# Patient Record
Sex: FEMALE | Race: WHITE | Employment: FULL TIME | URBAN - METROPOLITAN AREA
[De-identification: names, ages, dates, MRNs, and addresses within clinical notes are randomized per-mention and may not be internally consistent; named-entity substitution may affect disease eponyms.]

---

## 2019-03-06 ENCOUNTER — HOSPITAL ENCOUNTER (OUTPATIENT)
Dept: PHYSICAL THERAPY | Age: 54
Discharge: HOME | End: 2019-03-06
Payer: COMMERCIAL

## 2019-03-06 PROCEDURE — 97161 PT EVAL LOW COMPLEX 20 MIN: CPT

## 2019-03-06 PROCEDURE — 97530 THERAPEUTIC ACTIVITIES: CPT

## 2019-03-06 PROCEDURE — 97110 THERAPEUTIC EXERCISES: CPT

## 2019-03-06 NOTE — PROGRESS NOTES
[facility] [address]              [address] [facility]        [place], [address] [place]   [phone] fax    Plan of Care/ Statement of Necessity for Physical Therapy Services  Patient name: Cami Ann Start of Care: 3/6/2019   Referral source: Trista Ragsdale MD : 1965    Medical Diagnosis: TMJ (temporomandibular joint disorder) [M26.609]  Payor: OPTIMA / Plan: [place] [employer]  CAPITATED PT / Product Type: Commerical /  Onset Date:1-2 months prior to IE    Treatment Diagnosis: Left Craniofacial Pain   Prior Hospitalization: see medical history Provider#: [id number]   Medications: Verified on Patient summary List    Comorbidities: Diabetes, HTN, Depression, Chronic Recurrent Ear Infections, Anxiety, BMI>30, GI disease   Prior Level of Function:  (I) Functional ADLs, (I) Self-Care ADLs, Work [employer] of Care and following information is based on the information from the initial evaluation. Assessment:    Patient presents with chronic recurrent bilateral ear infections without known issue with most recent ear infection x1-2 months without positive benefit with medicinal prescription. At time of evaluation patient with normal cranial nerve screen with normal screen of upper cervical spine and temporomandibular joint therefore this therapist with the belief that patient is not experiencing symptoms secondary to TMJ nor upper cervical dysfunction. Question patient benefit with provision of OTC dental  secondary to [address] consistent with jaw clenching with potential nocturnal bruxism. Patient provided with exercises to improve secondary muscular hypertonicity due to chronic ear infection. Secondary to lack of positive objective findings patient to be placed on 30 day hold to allow for completion of further diagnostic testing. Patient educated to contact clinic if further physical therapy services deemed appropriate.     Key Information:    Cervical Retraction: [x] WNL    [] Abnormal:              - Left retro-aural tightness with suboccipital tightness     CN Screen: Diminished hearing on left, All other screening WNL     TMJ Screen: All AROM WNL without symptom provocation, No pain with depression with OP              - Palpation: Symmetrical TMJ movement bilaterally              - Extra-Oral Palpation: symmetrical TTP to bilateral masseters    Cervical Special Tests:       Alar Ligament: Normal end-feel left/right       Transverse Ligament: Normal              Cervical Flexion Rotation Test: (-) Bilaterally    Evaluation Complexity History MEDIUM  Complexity : 1-2 comorbidities / personal factors will impact the outcome/ POC ; Examination LOW Complexity : 1-2 Standardized tests and measures addressing body structure, function, activity limitation and / or participation in recreation  ;Presentation LOW Complexity : Stable, uncomplicated  ;Clinical Decision Making MEDIUM Complexity : FOTO score of 26-74  Overall Complexity Rating: LOW   Problem List: pain affecting function, decrease ROM and decrease strength   Treatment Plan may include any combination of the following: Therapeutic exercise, Therapeutic activities, Neuromuscular re-education, Physical agent/modality, Manual therapy, Patient education, Self Care training and Functional mobility training  Patient / Family readiness to learn indicated by: asking questions, trying to perform skills and interest  Persons(s) to be included in education: patient (P)  Barriers to Learning/Limitations: None  Patient Goal (s): I just want my ears to stop hurting  Patient Self Reported Health Status: poor  Rehabilitation Potential: fair    Short Term Goals: To be accomplished in 2 treatments:  1. Patient will subjectively report full compliance with prescribed HEP. Eval: HEP provided  2. Patient will demonstrate cervical extension and left rotation AROM WNL without pain to improve ease with overhead ADLs.   Eval: Cervical Extension AROM = WNL (right retro-aural fullness), Cervical Left Rotation AROM (right retro-aural tightness)  3. Patient will demonstrate a significant functional improvement as demonstrated by a score of >/= 75 on FOTO. Eval: FOTO = 71     **Further physical therapy goals to be created if further physical therapy services deemed appropriate. Frequency / Duration: Patient to be seen 1 times per week for 2 weeks. Patient/ Caregiver education and instruction: Diagnosis, prognosis, self care, activity modification, brace/ splint application and exercises   [x]  Plan of care has been reviewed with JIMMIE Wolf, PT 3/6/2019 12:21 PM  ________________________________________________________________________    I certify that the above Therapy Services are being furnished while the patient is under my care. I agree with the treatment plan and certify that this therapy is necessary.     Physician's Signature:____________Date:_________TIME:________    ** Signature, Date and Time must be completed for valid certification **  Please sign and return to [facility]/ Rashad Dowell [address]              [address] [facility], [address] [place]   [phone] fax

## 2019-04-09 NOTE — PROGRESS NOTES
[facility] [address] [facility] [place], [address] [place]  
[phone] fax Discharge Summary Patient name: Tre Cloud Start of Care: 3/6/2019 Referral source: Jah Chris MD : 1965 Medical/Treatment Diagnosis: TMJ (temporomandibular joint disorder) [M26.609] Payor: Jony Gomes / Plan: [facility] [email or web address]  CAPITATED PT / Product Type: Commerical /  Onset Date:1-2 months prior to IE 
  
Prior Hospitalization: see medical history Provider#: [id number] Medications: Verified on Patient Summary List   
Comorbidities: Diabetes, HTN, Depression, Chronic Recurrent Ear Infections, Anxiety, BMI>30, GI disease Prior Level of Function:  (I) Functional ADLs, (I) Self-Care ADLs, Work [employer] Visits from Start of Care: 1    Missed Visits: 0 Reporting Period : 3/6/2019 to 3/6/2019 Summary of Care: 
 
Short Term Goals: To be accomplished in 2 treatments: 1. Patient will subjectively report full compliance with prescribed HEP. Eval: HEP provided At DC: Inability to assess secondary to patient non-attendance 2. Patient will demonstrate cervical extension and left rotation AROM WNL without pain to improve ease with overhead ADLs. Eval: Cervical Extension AROM = WNL (right retro-aural fullness), Cervical Left Rotation AROM (right retro-aural tightness) At DC: Inability to assess secondary to patient non-attendance 3. Patient will demonstrate a significant functional improvement as demonstrated by a score of >/= 75 on FOTO. Eval: FOTO = 71 At DC: Inability to assess secondary to patient non-attendance ASSESSMENT/RECOMMENDATIONS: 
 
At this time patient to be discharged in accordance with clinic 30 day policy with patient without scheduled follow up appointments post-evaluation 3/6/2019.  At time of evaluation patient without positive objective findings with patient presenting with normal objective examination. Patient without contact with clinic within 30 day period. [x]Discontinue therapy: []Patient has reached or is progressing toward set goals [x]Patient is non-compliant or has abdicated 
    []Due to lack of appreciable progress towards set goals Shavon Yo, PT 4/9/2019 4:33 PM

## 2022-09-04 NOTE — PROGRESS NOTES
PT DAILY TREATMENT NOTE 10-18    Patient Name: Larry   Date:3/6/2019  : 1965  [x]  Patient  Verified  Payor: Imer Georges / Plan: [place] OPTIMA  [employer] PT / Product Type: Commerical /    In time:200  Out time:248  Total Treatment Time (min): 48  Visit #: 1 of 2    Treatment Area: TMJ (temporomandibular joint disorder) [M26.609]    Physical Therapy Evaluation Cervical Spine    SUBJECTIVE    Any medication changes, allergies to medications, adverse drug reactions, diagnosis change, or new procedure performed?: [x] No    [] Yes (see summary sheet for update)    Subjective functional status/changes:     PLOF: (I) Functional ADLs, (I) Self-Care ADLs, Work [employer]  Current Functional Status: No sleep disturbances, No functional activity limitations  Work Hx: [employer] Occupation  Comorbidities: Diabetes, HTN, Depression, Chronic Recurrent Ear Infections, Anxiety, BMI>30, GI isease  Medications: Advil (PRN)    Pain Intensity (0-10, VAS): Current 2-3, Worst 6, Best 2-3    Objective: Patient with a chronic 2-3 year history of recurrent bilateral ear infections with patient reporting temporary relief with provision of antibiotics. Patient reports most recent ear infection x1-2 months without improvement with prescription of medication with patient reporting symptoms localized to left ear with sensation of left ear fullness, diminished left hearing and occasional tinnitus. Patient denies pain localized to the TMJ bilaterally with patient denying the following: change in symptoms with mastication, pain with talking and pain with yawning. Patient does report occasional jaw clenching, associated with stress, with patient unsure regarding nocturnal grinding. Patient denies the following red flag s/s: HA/migraine, dizziness, head trauma, ear surgery, diplopia. Patient Goals: \"I just want my ears to stop hurting\"     OBJECTIVE    BP: 134/88 mmHg  Posture:  Increased forward head posture    Cervical Retraction: [x] WNL    [] Abnormal:   - Left retro-aural tightness with suboccipital tightness    CN Screen: Diminished hearing on left, All other screening WNL    TMJ Screen: All AROM WNL without symptom provocation, No pain with depression with OP   - Palpation: Symmetrical TMJ movement bilaterally   - Extra-Oral Palpation: symmetrical TTP to bilateral masseters    Active Movements: [] N/A   [] Too acute   [] Other:  ROM % AROM Comments   Forward flexion 0% limited    Extension 0% limited Left retro-aural fullness   SB right 0% limited    SB left  0% limited    Rotation right 0% limited    Rotation left 0% limited Left retro-aural tightness     Cervical Special Tests:       Alar Ligament: Normal end-feel left/right       Transverse Ligament: Normal        Cervical Flexion Rotation Test: (-) Bilaterally    Muscle Flexibility: [] N/A   Pect.  Minor: [] WNL    [x] Tight    [x] R    [x] L    Joint Mobility Assessment   Cervical:     Cervical Side Glide: Painful left lateral glide C2/C3    Prone UPA: No symptom reproduction with completion bilaterally C1-C3    OBJECTIVE    32 min [x]Eval                  []Re-Eval     8 min Therapeutic Exercise:  [x] See flow sheet : Patient educated in completion of prescribed HEP and provided with written HEP instructions   Rationale: increase ROM and increase strength to improve the patients ability to improve ease with self-care ADLs    8 min Therapeutic Activity:  [x]  See flow sheet : Discussion with patient regarding potential benefit of prescription of , Discussion regarding importance of following up with ENT secondary to recurrent ear infections   Rationale: increase ROM and increase strength  to improve the patients ability to improve overall health           With   [] TE   [] TA   [] neuro   [] other: Patient Education: [x] Review HEP    [] Progressed/Changed HEP based on:   [] positioning   [] body mechanics   [] transfers   [] heat/ice application    [] other:      Other Objective/Functional Measures: See objective information above. Pain Level (0-10 scale) post treatment: 2-3    ASSESSMENT/Changes in Function: Patient presents with chronic recurrent bilateral ear infections without known issue with most recent ear infection x1-2 months without positive benefit with medicinal prescription. At time of evaluation patient with normal cranial nerve screen with normal screen of upper cervical spine and temporomandibular joint therefore this therapist with the belief that patient is not experiencing symptoms secondary to TMJ nor upper cervical dysfunction. Question patient benefit with provision of OTC dental  secondary to [address] consistent with jaw clenching with potential nocturnal bruxism. Patient provided with exercises to improve secondary muscular hypertonicity due to chronic ear infection. Secondary to lack of positive objective findings patient to be placed on 30 day hold to allow for completion of further diagnostic testing. Patient educated to contact clinic if further physical therapy services deemed appropriate. [x]  See Plan of Care  []  See progress note/recertification  []  See Discharge Summary         Progress towards goals / Updated goals:    Short Term Goals: To be accomplished in 2 treatments:  1. Patient will subjectively report full compliance with prescribed HEP. Eval: HEP provided  2. Patient will demonstrate cervical extension and left rotation AROM WNL without pain to improve ease with overhead ADLs. Eval: Cervical Extension AROM = WNL (right retro-aural fullness), Cervical Left Rotation AROM (right retro-aural tightness)  3. Patient will demonstrate a significant functional improvement as demonstrated by a score of >/= 75 on FOTO. Eval: FOTO = 71    **Further physical therapy goals to be created if further physical therapy services deemed appropriate.     PLAN  []  Upgrade activities as tolerated     [] Continue plan of care  []  Update interventions per flow sheet       []  Discharge due to:_  [x]  Other:_  Hold x30 days    Sharif Caballero, PT 3/6/2019  12:20 PM    Future Appointments   Date Time Provider Ulises Torres   3/6/2019  2:00 PM Fernandez Duncan, PT [facility] PRINCIPAL DISCHARGE DIAGNOSIS  Diagnosis: GI bleed  Assessment and Plan of Treatment: From ulcer in the GI tract. Continue acid reducing medication (protonix/Pantoprazole) and follow up outpatient with the GI doctor and your primary medical doctor.       PRINCIPAL DISCHARGE DIAGNOSIS  Diagnosis: GI bleed  Assessment and Plan of Treatment: From ulcer in the GI tract. Continue acid reducing medication (protonix/Pantoprazole) and follow up outpatient with the GI doctor and your primary medical doctor.  Avoid any and all NSAIDs (examples: Ibuprofen, Aleve, motrin, naproxen, etc)

## 2023-09-25 PROBLEM — R63.1 POLYDIPSIA: Status: ACTIVE | Noted: 2021-08-10

## 2023-09-25 PROBLEM — G47.00 INSOMNIA: Status: ACTIVE | Noted: 2018-11-21

## 2023-09-25 PROBLEM — E11.65 POORLY CONTROLLED DIABETES MELLITUS (HCC): Status: ACTIVE | Noted: 2021-08-10

## 2023-09-25 PROBLEM — K76.0 HEPATIC STEATOSIS: Status: ACTIVE | Noted: 2018-11-21

## 2023-09-25 PROBLEM — E11.00 HYPEROSMOLAR HYPERGLYCEMIC STATE (HHS) (HCC): Status: ACTIVE | Noted: 2021-08-10

## 2023-09-25 PROBLEM — K57.32 DIVERTICULITIS OF COLON: Status: ACTIVE | Noted: 2018-11-21

## 2023-09-25 PROBLEM — A04.72 C. DIFFICILE DIARRHEA: Status: ACTIVE | Noted: 2018-12-13

## 2023-09-25 PROBLEM — J45.909 ASTHMA: Status: ACTIVE | Noted: 2019-05-14

## 2023-09-25 PROBLEM — E66.9 OBESITY (BMI 30.0-34.9): Status: ACTIVE | Noted: 2021-08-10

## 2023-09-25 PROBLEM — F32.0 CURRENT MILD EPISODE OF MAJOR DEPRESSIVE DISORDER (HCC): Status: ACTIVE | Noted: 2022-09-29

## 2023-09-25 PROBLEM — E11.8 TYPE 2 DIABETES MELLITUS WITH COMPLICATIONS (HCC): Status: ACTIVE | Noted: 2018-11-21

## 2023-09-25 PROBLEM — F32.A ACUTE DEPRESSION: Status: ACTIVE | Noted: 2019-05-14

## 2023-09-25 PROBLEM — R35.89 POLYURIA: Status: ACTIVE | Noted: 2021-08-10

## 2023-09-25 PROBLEM — R60.1 GENERALIZED EDEMA: Status: ACTIVE | Noted: 2019-07-19

## 2023-09-25 PROBLEM — E78.5 HLD (HYPERLIPIDEMIA): Status: ACTIVE | Noted: 2021-08-10

## 2023-09-25 PROBLEM — E87.1 HYPEROSMOLAR HYPONATREMIA: Status: ACTIVE | Noted: 2021-08-10

## 2023-09-25 PROBLEM — K21.9 GASTROESOPHAGEAL REFLUX DISEASE: Status: ACTIVE | Noted: 2019-05-14

## 2023-09-25 PROBLEM — Z91.148 NONCOMPLIANCE WITH MEDICATIONS: Status: ACTIVE | Noted: 2021-08-10

## 2023-09-25 PROBLEM — E11.9 DIABETES MELLITUS (HCC): Status: ACTIVE | Noted: 2019-05-14

## 2023-09-25 PROBLEM — A04.72 CLOSTRIDIUM DIFFICILE COLITIS: Status: ACTIVE | Noted: 2019-04-03

## 2023-09-25 PROBLEM — K21.9 GERD WITHOUT ESOPHAGITIS: Status: ACTIVE | Noted: 2018-11-21

## 2023-09-25 PROBLEM — F41.8 MIXED ANXIETY AND DEPRESSIVE DISORDER: Status: ACTIVE | Noted: 2019-05-14

## 2023-09-25 PROBLEM — E66.811 OBESITY (BMI 30.0-34.9): Status: ACTIVE | Noted: 2021-08-10

## 2023-09-25 PROBLEM — E87.0 HYPEROSMOLAR HYPONATREMIA: Status: ACTIVE | Noted: 2021-08-10

## 2023-10-27 ENCOUNTER — ANESTHESIA (OUTPATIENT)
Facility: HOSPITAL | Age: 58
End: 2023-10-27
Payer: COMMERCIAL

## 2023-10-27 ENCOUNTER — ANESTHESIA EVENT (OUTPATIENT)
Facility: HOSPITAL | Age: 58
End: 2023-10-27
Payer: COMMERCIAL

## 2023-10-27 ENCOUNTER — HOSPITAL ENCOUNTER (OUTPATIENT)
Facility: HOSPITAL | Age: 58
Setting detail: OUTPATIENT SURGERY
Discharge: HOME | End: 2023-10-27
Attending: INTERNAL MEDICINE | Admitting: INTERNAL MEDICINE
Payer: COMMERCIAL

## 2023-10-27 VITALS
HEIGHT: 66 IN | OXYGEN SATURATION: 100 % | BODY MASS INDEX: 24.98 KG/M2 | WEIGHT: 155.4 LBS | TEMPERATURE: 97.5 F | SYSTOLIC BLOOD PRESSURE: 149 MMHG | HEART RATE: 90 BPM | RESPIRATION RATE: 13 BRPM | DIASTOLIC BLOOD PRESSURE: 76 MMHG

## 2023-10-27 LAB
GLUCOSE BLD STRIP.AUTO-MCNC: 239 MG/DL (ref 70–110)
GLUCOSE BLD STRIP.AUTO-MCNC: 375 MG/DL (ref 70–110)

## 2023-10-27 PROCEDURE — 3600007502: Performed by: INTERNAL MEDICINE

## 2023-10-27 PROCEDURE — 3700000000 HC ANESTHESIA ATTENDED CARE: Performed by: INTERNAL MEDICINE

## 2023-10-27 PROCEDURE — 88305 TISSUE EXAM BY PATHOLOGIST: CPT

## 2023-10-27 PROCEDURE — 7100000010 HC PHASE II RECOVERY - FIRST 15 MIN: Performed by: INTERNAL MEDICINE

## 2023-10-27 PROCEDURE — 82962 GLUCOSE BLOOD TEST: CPT

## 2023-10-27 PROCEDURE — 3600007512: Performed by: INTERNAL MEDICINE

## 2023-10-27 PROCEDURE — 3700000001 HC ADD 15 MINUTES (ANESTHESIA): Performed by: INTERNAL MEDICINE

## 2023-10-27 PROCEDURE — 6370000000 HC RX 637 (ALT 250 FOR IP): Performed by: STUDENT IN AN ORGANIZED HEALTH CARE EDUCATION/TRAINING PROGRAM

## 2023-10-27 PROCEDURE — 2709999900 HC NON-CHARGEABLE SUPPLY: Performed by: INTERNAL MEDICINE

## 2023-10-27 PROCEDURE — 7100000000 HC PACU RECOVERY - FIRST 15 MIN: Performed by: INTERNAL MEDICINE

## 2023-10-27 PROCEDURE — 2580000003 HC RX 258: Performed by: STUDENT IN AN ORGANIZED HEALTH CARE EDUCATION/TRAINING PROGRAM

## 2023-10-27 RX ORDER — DEXTROSE MONOHYDRATE 100 MG/ML
INJECTION, SOLUTION INTRAVENOUS CONTINUOUS PRN
Status: DISCONTINUED | OUTPATIENT
Start: 2023-10-27 | End: 2023-10-27 | Stop reason: HOSPADM

## 2023-10-27 RX ORDER — SODIUM CHLORIDE, SODIUM LACTATE, POTASSIUM CHLORIDE, CALCIUM CHLORIDE 600; 310; 30; 20 MG/100ML; MG/100ML; MG/100ML; MG/100ML
INJECTION, SOLUTION INTRAVENOUS CONTINUOUS
Status: DISCONTINUED | OUTPATIENT
Start: 2023-10-27 | End: 2023-10-27 | Stop reason: HOSPADM

## 2023-10-27 RX ORDER — INSULIN LISPRO 100 [IU]/ML
0-15 INJECTION, SOLUTION INTRAVENOUS; SUBCUTANEOUS ONCE
Status: COMPLETED | OUTPATIENT
Start: 2023-10-27 | End: 2023-10-27

## 2023-10-27 RX ORDER — PROPOFOL 10 MG/ML
INJECTION, EMULSION INTRAVENOUS PRN
Status: DISCONTINUED | OUTPATIENT
Start: 2023-10-27 | End: 2023-10-27 | Stop reason: SDUPTHER

## 2023-10-27 RX ADMIN — PROPOFOL 130 MG: 10 INJECTION, EMULSION INTRAVENOUS at 11:39

## 2023-10-27 RX ADMIN — PROPOFOL 20 MG: 10 INJECTION, EMULSION INTRAVENOUS at 11:53

## 2023-10-27 RX ADMIN — PROPOFOL 30 MG: 10 INJECTION, EMULSION INTRAVENOUS at 11:48

## 2023-10-27 RX ADMIN — Medication 60 MG: at 11:39

## 2023-10-27 RX ADMIN — PROPOFOL 30 MG: 10 INJECTION, EMULSION INTRAVENOUS at 11:51

## 2023-10-27 RX ADMIN — INSULIN LISPRO 15 UNITS: 100 INJECTION, SOLUTION INTRAVENOUS; SUBCUTANEOUS at 10:43

## 2023-10-27 RX ADMIN — PROPOFOL 30 MG: 10 INJECTION, EMULSION INTRAVENOUS at 11:42

## 2023-10-27 RX ADMIN — SODIUM CHLORIDE, POTASSIUM CHLORIDE, SODIUM LACTATE AND CALCIUM CHLORIDE: 600; 310; 30; 20 INJECTION, SOLUTION INTRAVENOUS at 10:42

## 2023-10-27 RX ADMIN — PROPOFOL 30 MG: 10 INJECTION, EMULSION INTRAVENOUS at 11:44

## 2023-10-27 ASSESSMENT — ENCOUNTER SYMPTOMS: SHORTNESS OF BREATH: 1

## 2023-10-27 ASSESSMENT — PAIN SCALES - GENERAL: PAINLEVEL_OUTOF10: 0

## 2023-10-27 NOTE — ANESTHESIA POSTPROCEDURE EVALUATION
Department of Anesthesiology  Postprocedure Note    Patient: Mey Quiroga  MRN: [id number]  YOB: 1965  Date of evaluation: 10/27/2023      Procedure Summary     Date: 10/27/23 Room / Location: [facility] ENDO 03 / [facility] ENDOSCOPY    Anesthesia Start: 1132 Anesthesia Stop: 1203    Procedures:       COLONOSCOPY with random colon biopsies      EGD ESOPHAGOGASTRODUODENOSCOPY with biopsies (Upper GI Region) Diagnosis:       Functional diarrhea      Change in bowel habits      Non-alcoholic fatty liver disease      (Functional diarrhea [K59.1])      (Change in bowel habits [R19.4])      (Non-alcoholic fatty liver disease [K76.0])    Surgeons: Heather Huerta MD Responsible Provider: Rosie Hamilton MD    Anesthesia Type: MAC ASA Status: 3          Anesthesia Type: MAC    Ava Phase I: Ava Score: 10    Ava Phase II: Ava Score: 10      Anesthesia Post Evaluation    Patient location during evaluation: PACU  Patient participation: complete - patient participated  Level of consciousness: sleepy but conscious  Pain score: 0  Airway patency: patent  Nausea & Vomiting: no nausea and no vomiting  Complications: no  Cardiovascular status: blood pressure returned to baseline  Respiratory status: acceptable  Hydration status: euvolemic  Pain management: adequate

## 2023-10-27 NOTE — H&P
tablet by mouth twice a day  pantoprazole 40 mg  Pristiq 100 mg Take 1 tablet by mouth once a day  Recombivax HB (PF) 10 mcg/mL One ml injection in deltoid 0 30 and 180 days  Spironolactone 50 mg Take 1 tablet by mouth once a day  Max Marquez SoloStar 300 unit/mL (3 mL) Administer 75 unit subcutaneously every morning  trazodone 50 mg Take 1 tablet by mouth 1-2 night  Trulicity 1.5 AI/5.7 mL Administer 1 pen injector subcutaneously once a week  Vitamin D3 5,000 unit Take 1 tablet by mouth once a day  zolpidem 10 mg Take 1 tablet by mouth once a day  Allergies:   Augmentin - GI upset  Immunizations:   Covid-19, 1/7/2022 x2  Flu vaccine (refused)  Social History  Alcohol:   Uses rarely. Tobacco:   Former smoker  Cigarettes 10 cigarettes a day, quit 11-9-15. Drugs:   None  Exercise:   Walking. Caffeine:   Tea.  daily. Marital Status:         Occupation:    Assistant Rent Manager     Family History   Other: ; Diagnosed with Alcoholism, Breast Cancer, High Blood Pressure; Maternal Grandmother: ; Diagnosed with Alcoholism, Breast Cancer, Cancer, High Blood Pressure; Mother: Healthy; Diagnosed with Breast Cancer, Cholesterol; Father: Healthy;  Review of Systems:  Allergic/Immunologic: Denies allergic reactions, current infections. Cardiovascular: Denies chest pain, irregular heart beat, rapid heart rate/palpitations, ankle swelling. Constitutional: Denies fever, loss of appetite, weight loss. ENMT: Denies nose bleeds, loss of vision, hoarseness, mouth sores. Endocrine: Denies excessive thirst, heat or cold intolerance. Gastrointestinal: Complains of abdominal pain, change in bowel habits, nausea. Denies abdominal swelling, constipation, diarrhea, gas, heartburn, rectal bleeding, stomach cramps, vomiting, difficulty swallowing, yellowing of skin. Genitourinary: Denies blood in urine, recent darkening of urine. Hematologic/Lymphatic: Denies easy bruising, anemia.   Integumentary: Denies itching, rashes, rashes/hives. Musculoskeletal: Denies back pain, joint pain/arthritis. Neurological: Denies dizziness, fainting, frequent headaches, vertigo, memory loss/confusion. Psychiatric: Denies depression, anxiety/panic attacks. Respiratory: Denies wheezing, frequent cough, shortness of breath when at rest.  Vital Signs:  Weight (lbs/oz) Height (ft/in) BMI  164 / 5 / 6 26.47  Physical Exam:  Constitutional:  Appearance: Well developed, well nourished in no acute distress. Skin:  Inspection: No rash or obvious lesion. No spider angioma noted,Not obviously jaundiced. Palpation: No obvious induration or nodules. Eyes:  Conjunctivae/lids: Conjunctiva normal, non-icteric. Pupils/irises: PERRLA. ENMT:  External: Normal appearance of external ears, nose and mouth. Oropharynx: Normal oropharynx mucosa. Neck:  Neck: Supple with no obvious mass, asymmetry, or JVD. Thyroid: No thyromegaly noted. Respiratory:  Effort: Normal respiratory effort, with no obvious accessory muscle use. Auscultation: No rhonchi, wheezes, or rales bilaterally. Chest:  Inspection: No obvious deformities. Palpation: Costal margin and xyphoid process nontender. Cardiovascular: Auscultation: RRR, without murmur noted. Peripheral: Normal peripheral pulses bilaterally. Gastrointestinal/Abdomen:  Abdomen: Normoactive bowel sounds present,Nondistended, nontender, no rebound or guarding, no mass, no fullness. Liver/Spleen: No hepatosplenomegaly noted. Hernias: none. Extremities:  RLE: No cyanosis or edema noted. LLE: No cyanosis or edema noted. Psychiatric:  Orientation: Alert and oriented to person, place, and time. Mood and affect: Mood and affect appropriate for the situation.   Assessment / Diagnosis:   Functional diarrhea  Abdominal pain at multiple sites  Change in bowel habits  Left lower quadrant abdominal pain  Non alcoholic fatty liver disease  Plan:   Colonoscopy and upper endoscopy   [place]

## 2023-10-27 NOTE — DISCHARGE INSTRUCTIONS
Upper GI Endoscopy: What to Expect at [address] [place] [address] [place] had an upper GI endoscopy. Your doctor used a thin, lighted tube that bends to look at the inside of your esophagus, your stomach, and the first part of the small intestine, called the duodenum. After you have an endoscopy, you will stay at the hospital or clinic for 1 to 2 hours. This will allow the medicine to wear off. You will be able to go home after your doctor or nurse checks to make sure that you're not having any problems. You may have to stay overnight if you had treatment during the test. You may have a sore throat for a day or two after the test.  This care sheet gives you a general idea about what to expect after the test.  How can you care for yourself at home? Activity   Rest as much as you need to after you go home. You should be able to go back to your usual activities the day after the test.  Diet   Follow your doctor's directions for eating after the test.  Drink plenty of fluids (unless your doctor has told you not to). Medications   If you have a sore throat the day after the test, use an over-the-counter spray to numb your throat. Follow-up care is a key part of your treatment and safety. Be sure to make and go to all appointments, and call your doctor if you are having problems. It's also a good idea to know your test results and keep a list of the medicines you take. When should you call for help? Call 911 anytime you think you may need emergency care. For example, call if:    You passed out (lost consciousness). You have trouble breathing. You pass maroon or bloody stools. Call your doctor now or seek immediate medical care if:    You have pain that does not get better after your take pain medicine. You have new or worse belly pain. You have blood in your stools. You are sick to your stomach and cannot keep fluids down. You have a fever. You cannot pass stools or gas.    Watch closely for

## 2023-10-27 NOTE — ANESTHESIA PRE PROCEDURE
Department of Anesthesiology  Preprocedure Note       Name:  Maria R Albright   Age:  65 y.o.  :  1965                                          MRN:  [id number]         Date:  10/27/2023      Surgeon: Yaw Preciado):  Urmila Thomson MD    Procedure: Procedure(s):  COLONOSCOPY    Medications prior to admission:   Prior to Admission medications    Medication Sig Start Date End Date Taking? Authorizing Provider   aspirin 81 MG EC tablet Take 1 tablet every day by oral route. Bradley Joyner MD   atorvastatin (LIPITOR) 10 MG tablet Take by mouth    Bradley Joyner MD   vitamin D 25 MCG (1000 UT) CAPS Take 2 capsules every day by oral route. Bradley Joyner MD   dicyclomine (BENTYL) 10 MG capsule take 1 capsule by mouth three times a day if needed    Bradley Joyner MD   desvenlafaxine succinate (PRISTIQ) 100 MG TB24 extended release tablet Take 1 tablet by mouth daily 23   Bradley Joyner MD   dulaglutide (TRULICITY) 1.5 CC/1.2TH SC injection Inject 0.5 mLs into the skin every 7 days 23   Bradley Joyner MD   gabapentin (NEURONTIN) 300 MG capsule Take 1 capsule by mouth nightly. 23   Bradley Joyner MD   glipiZIDE (GLUCOTROL XL) 10 MG extended release tablet Take 1 tablet by mouth daily 4/15/22   Bradley Joyner MD   insulin glargine (LANTUS SOLOSTAR) 100 UNIT/ML injection pen inject 55 units BENEATH THE SKIN every 24 hours    Bradley Joyner MD   insulin lispro, 1 Unit Dial, (HUMALOG KWIKPEN) 100 UNIT/ML SOPN inject 15 units BENEATH THE SKIN three times a day with meals    Bradley Joyner MD   LORazepam (ATIVAN) 0.5 MG tablet Take 1 tablet by mouth daily.     Bradley Joyner MD   losartan (COZAAR) 100 MG tablet Take 1 tablet by mouth daily  Patient not taking: Reported on 10/27/2023 11/17/21   Bradley Joyner MD   metFORMIN (GLUCOPHAGE) 850 MG tablet Take by mouth 2 times daily (with meals)    Bradley Joyner MD

## 2024-07-29 ENCOUNTER — TELEPHONE (OUTPATIENT)
Facility: HOSPITAL | Age: 59
End: 2024-07-29

## 2024-08-06 ENCOUNTER — TELEPHONE (OUTPATIENT)
Facility: HOSPITAL | Age: 59
End: 2024-08-06

## (undated) DEVICE — CANNULA ORIG TL CLR W FOAM CUSHIONS AND 14FT SUPL TB 3 CHN

## (undated) DEVICE — UNDERPAD INCONT W23XL36IN STD BLU POLYPR BK FLUF SFT

## (undated) DEVICE — GAUZE,SPONGE,4"X4",16PLY,STRL,LF,10/TRAY: Brand: MEDLINE

## (undated) DEVICE — SINGLE-USE BIOPSY FORCEPS: Brand: RADIAL JAW 4

## (undated) DEVICE — BASIN EMSIS 16OZ GRAPHITE PLAS KID SHP MOLD GRAD FOR ORAL

## (undated) DEVICE — SYRINGE MED 25GA 3ML L5/8IN SUBQ PLAS W/ DETACH NDL SFTY

## (undated) DEVICE — SYRINGE MED 10ML LUERLOCK TIP W/O SFTY DISP

## (undated) DEVICE — MEDI-VAC NON-CONDUCTIVE SUCTION TUBING: Brand: CARDINAL HEALTH

## (undated) DEVICE — SYRINGE MED 50ML LUERLOCK TIP

## (undated) DEVICE — CATHETER SCLERO L240CM NDL 25GA L4MM SHTH DIA2.3MM CNTRST

## (undated) DEVICE — SYRINGE MED 50ML LUERSLIP TIP